# Patient Record
Sex: MALE | Race: WHITE | NOT HISPANIC OR LATINO | URBAN - METROPOLITAN AREA
[De-identification: names, ages, dates, MRNs, and addresses within clinical notes are randomized per-mention and may not be internally consistent; named-entity substitution may affect disease eponyms.]

---

## 2020-11-01 ENCOUNTER — EMERGENCY (EMERGENCY)
Facility: HOSPITAL | Age: 25
LOS: 1 days | Discharge: HOME | End: 2020-11-01
Admitting: EMERGENCY MEDICINE
Payer: COMMERCIAL

## 2020-11-01 VITALS
HEART RATE: 87 BPM | SYSTOLIC BLOOD PRESSURE: 129 MMHG | TEMPERATURE: 98 F | RESPIRATION RATE: 18 BRPM | OXYGEN SATURATION: 98 % | DIASTOLIC BLOOD PRESSURE: 73 MMHG

## 2020-11-01 DIAGNOSIS — Z88.1 ALLERGY STATUS TO OTHER ANTIBIOTIC AGENTS STATUS: ICD-10-CM

## 2020-11-01 DIAGNOSIS — R20.2 PARESTHESIA OF SKIN: ICD-10-CM

## 2020-11-01 DIAGNOSIS — M79.661 PAIN IN RIGHT LOWER LEG: ICD-10-CM

## 2020-11-01 LAB
ALBUMIN SERPL ELPH-MCNC: 5 G/DL — SIGNIFICANT CHANGE UP (ref 3.4–5)
ALP SERPL-CCNC: 69 U/L — SIGNIFICANT CHANGE UP (ref 40–120)
ALT FLD-CCNC: 19 U/L — SIGNIFICANT CHANGE UP (ref 12–42)
ANION GAP SERPL CALC-SCNC: 10 MMOL/L — SIGNIFICANT CHANGE UP (ref 9–16)
ANION GAP SERPL CALC-SCNC: 8 MMOL/L — LOW (ref 9–16)
AST SERPL-CCNC: 34 U/L — SIGNIFICANT CHANGE UP (ref 15–37)
BASOPHILS # BLD AUTO: 0.04 K/UL — SIGNIFICANT CHANGE UP (ref 0–0.2)
BASOPHILS NFR BLD AUTO: 0.8 % — SIGNIFICANT CHANGE UP (ref 0–2)
BILIRUB SERPL-MCNC: 0.9 MG/DL — SIGNIFICANT CHANGE UP (ref 0.2–1.2)
BUN SERPL-MCNC: 21 MG/DL — SIGNIFICANT CHANGE UP (ref 7–23)
BUN SERPL-MCNC: 23 MG/DL — SIGNIFICANT CHANGE UP (ref 7–23)
CALCIUM SERPL-MCNC: 9.7 MG/DL — SIGNIFICANT CHANGE UP (ref 8.5–10.5)
CALCIUM SERPL-MCNC: 9.7 MG/DL — SIGNIFICANT CHANGE UP (ref 8.5–10.5)
CHLORIDE SERPL-SCNC: 105 MMOL/L — SIGNIFICANT CHANGE UP (ref 96–108)
CHLORIDE SERPL-SCNC: 106 MMOL/L — SIGNIFICANT CHANGE UP (ref 96–108)
CO2 SERPL-SCNC: 25 MMOL/L — SIGNIFICANT CHANGE UP (ref 22–31)
CO2 SERPL-SCNC: 26 MMOL/L — SIGNIFICANT CHANGE UP (ref 22–31)
CREAT SERPL-MCNC: 0.89 MG/DL — SIGNIFICANT CHANGE UP (ref 0.5–1.3)
CREAT SERPL-MCNC: 0.97 MG/DL — SIGNIFICANT CHANGE UP (ref 0.5–1.3)
EOSINOPHIL # BLD AUTO: 0.08 K/UL — SIGNIFICANT CHANGE UP (ref 0–0.5)
EOSINOPHIL NFR BLD AUTO: 1.6 % — SIGNIFICANT CHANGE UP (ref 0–6)
GLUCOSE SERPL-MCNC: 133 MG/DL — HIGH (ref 70–99)
GLUCOSE SERPL-MCNC: 92 MG/DL — SIGNIFICANT CHANGE UP (ref 70–99)
HCT VFR BLD CALC: 43.7 % — SIGNIFICANT CHANGE UP (ref 39–50)
HGB BLD-MCNC: 14.5 G/DL — SIGNIFICANT CHANGE UP (ref 13–17)
IMM GRANULOCYTES NFR BLD AUTO: 0.2 % — SIGNIFICANT CHANGE UP (ref 0–1.5)
LYMPHOCYTES # BLD AUTO: 1.22 K/UL — SIGNIFICANT CHANGE UP (ref 1–3.3)
LYMPHOCYTES # BLD AUTO: 24.7 % — SIGNIFICANT CHANGE UP (ref 13–44)
MCHC RBC-ENTMCNC: 29.5 PG — SIGNIFICANT CHANGE UP (ref 27–34)
MCHC RBC-ENTMCNC: 33.2 GM/DL — SIGNIFICANT CHANGE UP (ref 32–36)
MCV RBC AUTO: 89 FL — SIGNIFICANT CHANGE UP (ref 80–100)
MONOCYTES # BLD AUTO: 0.52 K/UL — SIGNIFICANT CHANGE UP (ref 0–0.9)
MONOCYTES NFR BLD AUTO: 10.5 % — SIGNIFICANT CHANGE UP (ref 2–14)
NEUTROPHILS # BLD AUTO: 3.06 K/UL — SIGNIFICANT CHANGE UP (ref 1.8–7.4)
NEUTROPHILS NFR BLD AUTO: 62.2 % — SIGNIFICANT CHANGE UP (ref 43–77)
NRBC # BLD: 0 /100 WBCS — SIGNIFICANT CHANGE UP (ref 0–0)
PLATELET # BLD AUTO: 176 K/UL — SIGNIFICANT CHANGE UP (ref 150–400)
POTASSIUM SERPL-MCNC: 3.9 MMOL/L — SIGNIFICANT CHANGE UP (ref 3.5–5.3)
POTASSIUM SERPL-MCNC: 5.5 MMOL/L — HIGH (ref 3.5–5.3)
POTASSIUM SERPL-SCNC: 3.9 MMOL/L — SIGNIFICANT CHANGE UP (ref 3.5–5.3)
POTASSIUM SERPL-SCNC: 5.5 MMOL/L — HIGH (ref 3.5–5.3)
PROT SERPL-MCNC: 8.2 G/DL — SIGNIFICANT CHANGE UP (ref 6.4–8.2)
RBC # BLD: 4.91 M/UL — SIGNIFICANT CHANGE UP (ref 4.2–5.8)
RBC # FLD: 13.1 % — SIGNIFICANT CHANGE UP (ref 10.3–14.5)
SODIUM SERPL-SCNC: 139 MMOL/L — SIGNIFICANT CHANGE UP (ref 132–145)
SODIUM SERPL-SCNC: 141 MMOL/L — SIGNIFICANT CHANGE UP (ref 132–145)
WBC # BLD: 4.93 K/UL — SIGNIFICANT CHANGE UP (ref 3.8–10.5)
WBC # FLD AUTO: 4.93 K/UL — SIGNIFICANT CHANGE UP (ref 3.8–10.5)

## 2020-11-01 PROCEDURE — 99284 EMERGENCY DEPT VISIT MOD MDM: CPT

## 2020-11-01 NOTE — ED ADULT NURSE NOTE - CHPI ED NUR SYMPTOMS NEG
no change in level of consciousness/no dizziness/no fever/no numbness/no weakness/no loss of consciousness/no vomiting/no confusion/no blurred vision/no nausea

## 2020-11-01 NOTE — ED ADULT NURSE NOTE - OBJECTIVE STATEMENT
Patient, 26 yo, male came to this unit c/o tingling and burning to bilateral lower legs and feet x 5days. Pt states that he already visited his doctor and started taking amitriptiline as prescribed. Pt denies fever, numbness, chest pain, chills, N/V/D. Pt speaking full sentences and keeps full movement in all extremities. Pt no apparent distress at this time.

## 2020-11-01 NOTE — ED PROVIDER NOTE - PATIENT PORTAL LINK FT
You can access the FollowMyHealth Patient Portal offered by [facility] by registering at the following website: [email or web address]. By joining [employer]’s FollowMyHealth portal, you will also be able to view your health information using other applications (apps) compatible with our system.

## 2020-11-01 NOTE — ED PROVIDER NOTE - CARE PROVIDERS DIRECT ADDRESSES
[email or web address]

## 2020-11-01 NOTE — ED PROVIDER NOTE - MUSCULOSKELETAL, MLM
b/l LE: No bony tenderness. Normal plantarflexion and dorsiflexion. DP/PT pulses equal bilaterally, no skin changes, sensation equal and intact bilaterally

## 2020-11-01 NOTE — ED PROVIDER NOTE - CARE PROVIDER_API CALL
Gurjit Boo  NEUROLOGY  [address], [address] [place] [id number]  Phone: [phone]  Fax: [phone]  Follow Up Time:     Kina Wilson  NEUROLOGY  [address], [address] [place] [address]  Phone: [phone]  Fax: [phone]  Follow Up Time:

## 2020-11-01 NOTE — ED ADULT TRIAGE NOTE - CHIEF COMPLAINT QUOTE
pt. c/o pain, tingling and burning to bilateral lower legs and feet x5days, no visible injuries or rash to legs.

## 2020-11-01 NOTE — ED PROVIDER NOTE - OBJECTIVE STATEMENT
26 yo M w/ PMHx of anemia and anxiety presents with b/l LE burning sensation x 5 days; pt reports burning sensation from feet to mid thigh. Pt denies focal weakness, gait disturbance, back or leg pain. Pt reports recent telehealth visit, in which his PCP prescribed amitriptyline and told the pt to follow up with neurology.

## 2020-11-01 NOTE — ED PROVIDER NOTE - CLINICAL SUMMARY MEDICAL DECISION MAKING FREE TEXT BOX
Pt presents to the ED w/ LE paresthesias. Will check labs; if normal, will refer to neurology for workup.

## 2020-11-13 ENCOUNTER — EMERGENCY (EMERGENCY)
Facility: HOSPITAL | Age: 25
LOS: 1 days | Discharge: HOME | End: 2020-11-13
Attending: EMERGENCY MEDICINE | Admitting: EMERGENCY MEDICINE
Payer: COMMERCIAL

## 2020-11-13 VITALS
OXYGEN SATURATION: 99 % | RESPIRATION RATE: 16 BRPM | HEART RATE: 98 BPM | HEIGHT: 76 IN | DIASTOLIC BLOOD PRESSURE: 79 MMHG | TEMPERATURE: 98 F | WEIGHT: 160.06 LBS | SYSTOLIC BLOOD PRESSURE: 129 MMHG

## 2020-11-13 DIAGNOSIS — H93.13 TINNITUS, BILATERAL: ICD-10-CM

## 2020-11-13 DIAGNOSIS — R51.9 HEADACHE, UNSPECIFIED: ICD-10-CM

## 2020-11-13 PROCEDURE — 70450 CT HEAD/BRAIN W/O DYE: CPT | Mod: 26

## 2020-11-13 PROCEDURE — 99284 EMERGENCY DEPT VISIT MOD MDM: CPT

## 2020-11-13 RX ADMIN — Medication 0.5 MILLIGRAM(S): at 19:59

## 2020-11-13 RX ADMIN — Medication 0.5 MILLIGRAM(S): at 20:00

## 2020-11-13 NOTE — ED ADULT NURSE NOTE - CHIEF COMPLAINT QUOTE
Walk in with c/o headache with blurry vision and b/i tinnitus intermittent x2 weeks, worsened with increased "screen time". No n/v, no fevers, no associated fall or injury. Pt was seen urgent care in [place] for tinnitus this morning, prescribed prednisone.

## 2020-11-13 NOTE — ED PROVIDER NOTE - PHYSICAL EXAMINATION
CONSTITUTIONAL: Well-appearing; well-nourished; in no apparent distress.   	HEAD: Normocephalic; atraumatic.   	EYES:  conjunctiva and sclera clear  	ENT: normal nose; no rhinorrhea; normal pharynx with no erythema or lesions.   	NECK: Supple; non-tender;   	CARDIOVASCULAR: Normal S1, S2; no murmurs, rubs, or gallops. Regular rate and rhythm.   	RESPIRATORY: Breathing easily; breath sounds clear and equal bilaterally; no wheezes, rhonchi, or rales.  	GI: Soft; non-distended; non-tender; no palpable organomegaly.   	EXT: No cyanosis or edema; N/V intact  	SKIN: Normal for age and race; warm; dry; good turgor; no apparent lesions or rash.   	NEURO: A & O x 3; face symmetric; grossly unremarkable.   PSYCHOLOGICAL: The patient’s mood and manner are appropriate. CONSTITUTIONAL: Well-appearing; well-nourished; anxious but cooperative.   	HEAD: Normocephalic; atraumatic.   	EYES:  conjunctiva and sclera clear  	ENT: normal nose; no rhinorrhea; normal pharynx with no erythema or lesions.   	NECK: Supple; non-tender;   	CARDIOVASCULAR: Normal S1, S2; no murmurs, rubs, or gallops. Regular rate and rhythm.   	RESPIRATORY: Breathing easily; breath sounds clear and equal bilaterally; no wheezes, rhonchi, or rales.  	GI: Soft; non-distended; non-tender; no palpable organomegaly.   	EXT: No cyanosis or edema; N/V intact  	SKIN: Normal for age and race; warm; dry; good turgor; no apparent lesions or rash.   	NEURO: A & O x 3; face symmetric; grossly unremarkable.   PSYCHOLOGICAL: Anxious but the patient’s mood and manner are appropriate.

## 2020-11-13 NOTE — ED ADULT NURSE NOTE - NSIMPLEMENTINTERV_GEN_ALL_ED
Implemented All Universal Safety Interventions:  [place] to call system. Call bell, personal items and telephone within reach. Instruct patient to call for assistance. Room bathroom lighting operational. Non-slip footwear when patient is off stretcher. Physically safe environment: no spills, clutter or unnecessary equipment. Stretcher in lowest position, wheels locked, appropriate side rails in place.

## 2020-11-13 NOTE — ED PROVIDER NOTE - PATIENT PORTAL LINK FT
You can access the FollowMyHealth Patient Portal offered by [facility] by registering at the following website: [email or web address]. By joining [email or web address]’s FollowMyHealth portal, you will also be able to view your health information using other applications (apps) compatible with our system.

## 2020-11-13 NOTE — ED ADULT TRIAGE NOTE - CHIEF COMPLAINT QUOTE
Walk in with c/o headache with blurry vision and b/i tinnitus intermittent x2 weeks, worsened with increased "screen time". No n/v, no fevers, no associated fall or injury. Pt was seen urgent care in [place] for tinnitus this morning, prescribed prednisone.

## 2020-11-13 NOTE — ED PROVIDER NOTE - OBJECTIVE STATEMENT
24 yo male c/o blurry vision in both eyes for the past week with ringing in both ears x 2 days with associated headache. patient concerned that he will have a seizure after doing some research online, requesting CT. has been following with neurologist. does not wear contacts or contact lens. no headache currently. c/p tingling/burning in legs previously now resolved. no fever or cough.

## 2020-11-13 NOTE — ED PROVIDER NOTE - NSFOLLOWUPINSTRUCTIONS_ED_ALL_ED_FT
-PLEASE FOLLOW-UP WITH YOUR NEUROLOGIST ON MONDAY.  BRING ALL PAPERWORK FROM TODAY'S VISIT TO YOUR FOLLOW-UP VISIT.      -PLEASE RETURN TO THE ER IMMEDIATELY OR CALL 911 FOR ANY HIGH FEVER, TROUBLE BREATHING, VOMITING, SEVERE PAIN, OR ANY OTHER CONCERNS.

## 2020-11-13 NOTE — ED PROVIDER NOTE - ATTENDING CONTRIBUTION TO CARE
Pt is a 26yo M who p/w b/l blurry vision and tinnitus for the past 2 days.  Pt presented about 12 days ago with extremity tingling/numbness.  Evaluated and discharged with neuro f/u.  Pt evaluated by neurologist and sxs resolved.  New sxs started and he went to an  and was Rx'd prednisone.  Pt is anxious and reports googling his sxs and is concerned that he is going to have a seizure.   PE - agree with above.   Neuro - Patient is alert, oriented x person, place and time.  Cranial nerves 2-12 are intact.  Normal gait and speech.  Cerebellar testing normal:  negative Romberg, normal coordination and normal finger to nose, heal to shin and rapid alternating movements.  Normal proprioception and sensory exam.  No pronator drift.  5/5 bl upper extremity and lower extremity strength.  A/P - Please see MDM above.

## 2020-11-13 NOTE — ED PROVIDER NOTE - CLINICAL SUMMARY MEDICAL DECISION MAKING FREE TEXT BOX
Will perform CT to r/o any acute intracrainial pathology.  Pt appears anxious so will give ativan PO here to see if it improves sxs.  Strict instructions to go back to his neurologist to discuss new sxs.  Will re-evlauate.

## 2020-11-13 NOTE — ED PROVIDER NOTE - PROGRESS NOTE DETAILS
Pt is feeling better.  Discussed all results and instructed to f/u with his neurologist.  Strict return precautions discussed.

## 2020-11-14 PROBLEM — D64.9 ANEMIA, UNSPECIFIED: Chronic | Status: ACTIVE | Noted: 2020-11-01

## 2024-07-16 NOTE — ED PROVIDER NOTE - HIV OFFER
Prophylactic measure Previously Declined (within the last year) Lung nodules Medication management Lung nodules Lung nodules Prophylactic measure Lung nodules Lung nodules Prophylactic measure Lung nodules